# Patient Record
Sex: FEMALE | Race: WHITE
[De-identification: names, ages, dates, MRNs, and addresses within clinical notes are randomized per-mention and may not be internally consistent; named-entity substitution may affect disease eponyms.]

---

## 2017-12-21 ENCOUNTER — HOSPITAL ENCOUNTER (OUTPATIENT)
Dept: HOSPITAL 62 - SC | Age: 72
Discharge: HOME | End: 2017-12-21
Attending: OPHTHALMOLOGY
Payer: MEDICARE

## 2017-12-21 DIAGNOSIS — K21.9: ICD-10-CM

## 2017-12-21 DIAGNOSIS — Z79.899: ICD-10-CM

## 2017-12-21 DIAGNOSIS — F17.210: ICD-10-CM

## 2017-12-21 DIAGNOSIS — H43.813: ICD-10-CM

## 2017-12-21 DIAGNOSIS — Z79.1: ICD-10-CM

## 2017-12-21 DIAGNOSIS — H04.123: ICD-10-CM

## 2017-12-21 DIAGNOSIS — I10: ICD-10-CM

## 2017-12-21 DIAGNOSIS — H25.13: Primary | ICD-10-CM

## 2017-12-21 DIAGNOSIS — E78.00: ICD-10-CM

## 2017-12-21 DIAGNOSIS — E03.9: ICD-10-CM

## 2017-12-21 PROCEDURE — V2632 POST CHMBR INTRAOCULAR LENS: HCPCS

## 2017-12-21 PROCEDURE — 66984 XCAPSL CTRC RMVL W/O ECP: CPT

## 2017-12-21 PROCEDURE — 08RJ3JZ REPLACEMENT OF RIGHT LENS WITH SYNTHETIC SUBSTITUTE, PERCUTANEOUS APPROACH: ICD-10-PCS | Performed by: OPHTHALMOLOGY

## 2017-12-21 RX ADMIN — BESIFLOXACIN PRN DROP: 6 SUSPENSION OPHTHALMIC at 07:55

## 2017-12-21 RX ADMIN — BESIFLOXACIN PRN DROP: 6 SUSPENSION OPHTHALMIC at 07:15

## 2017-12-21 RX ADMIN — TROPICAMIDE PRN DROP: 10 SOLUTION/ DROPS OPHTHALMIC at 07:29

## 2017-12-21 RX ADMIN — CYCLOPENTOLATE HYDROCHLORIDE AND PHENYLEPHRINE HYDROCHLORIDE PRN DROP: 2; 10 SOLUTION/ DROPS OPHTHALMIC at 07:05

## 2017-12-21 RX ADMIN — CYCLOPENTOLATE HYDROCHLORIDE AND PHENYLEPHRINE HYDROCHLORIDE PRN DROP: 2; 10 SOLUTION/ DROPS OPHTHALMIC at 07:15

## 2017-12-21 RX ADMIN — CYCLOPENTOLATE HYDROCHLORIDE AND PHENYLEPHRINE HYDROCHLORIDE PRN DROP: 2; 10 SOLUTION/ DROPS OPHTHALMIC at 07:29

## 2017-12-21 RX ADMIN — BESIFLOXACIN PRN DROP: 6 SUSPENSION OPHTHALMIC at 07:05

## 2017-12-21 RX ADMIN — TROPICAMIDE PRN DROP: 10 SOLUTION/ DROPS OPHTHALMIC at 07:05

## 2017-12-21 RX ADMIN — TETRACAINE HYDROCHLORIDE PRN DROP: 5 SOLUTION OPHTHALMIC at 07:36

## 2017-12-21 RX ADMIN — TETRACAINE HYDROCHLORIDE PRN DROP: 5 SOLUTION OPHTHALMIC at 07:05

## 2017-12-21 RX ADMIN — TROPICAMIDE PRN DROP: 10 SOLUTION/ DROPS OPHTHALMIC at 07:15

## 2017-12-21 RX ADMIN — TETRACAINE HYDROCHLORIDE PRN DROP: 5 SOLUTION OPHTHALMIC at 07:29

## 2017-12-21 NOTE — [facility] OPERATIVE REPORT E
[facility] Operative [facility]



NAME: MIKEY YA

                                      MRN: [id number]

                             AGE: 72Y

DATE OF SURGERY: 12/21/2017           ROOM:



PREOPERATIVE DIAGNOSIS:

CATARACT, LEFT EYE.



POSTOPERATIVE DIAGNOSIS:

CATARACT, LEFT EYE.



OPERATION:

Cataract extraction with intraocular lens implant of the left eye.



SURGEON:

FAREED MCMAHON M.D.



ANESTHESIA:

Topical.



PROCEDURE:

After obtaining appropriate consent, the patient's left eye was prepped and

draped in sterile fashion as well as the surgeon in a sterile manner and

cataract surgery was started.  First a paracentesis blade was used to make

a small side-port incision.  Viscoelastic was used to inflate the anterior

chamber.  Next a 2.4 mm incision was made with the paracentesis blade.  A

continuous capsulorrhexis incision was made using a cystotome and Utrata

forceps.  Following this hydrodissection was carried out to make the lens

fully loose and mobile and it was rotated 90 degrees.  Following this, a

divide-and-conquer technique was used to phacoemulsify the lens with a CDE

of 1.46. The remaining cortex was removed with irrigation/aspiration. 

Provisc was instilled into the capsular bag to inflate the bag.  A SN60WF,

19.0 diopter lens was placed.  The remaining viscoelastic material was

removed with irrigation/aspiration.  Following this, a 10-0 nylon suture

was used to close the incision and it was found to be watertight.  Vigamox

was instilled in the eye and a protective shield was placed over the eye. 

The patient returned to the postoperative recovery in stable condition.







DICTATING PHYSICIAN: FAREED MCMAHON M.D.



1284M              DT: 12/21/2017 1546

PHY#: 2011         DD: 12/21/2017 1533

ID:   [id number]               JOB#: [id number]       ACCT: [id number]



cc:FAREED MCMAHON M.D.

>

## 2017-12-21 NOTE — DISCHARGE SUMMARY E
Discharge Summary



NAME: MIKEY YA

MRN:  [id number]        : 1945     AGE: 72Y

ADMITTED: 2017                  DISCHARGED: 2017



REASON FOR ADMISSION:

This is a 72-year-old patient who underwent cataract extraction of the

left eye.



DIAGNOSIS:

Cataract, left eye.



HOSPITAL COURSE:

She underwent surgery because she has having glare secondary to driving at

night secondary to headlights and also having trouble seeing road signs. 

She should be on a regular diet.  No bending at her waist.  No heavy

lifting.  She should use her Besivance, Ilevro, and Durezol at 3:00 p.m.

and 8:00 p.m. and sleep with a rigid shield, and I will see her for a

1-day postoperative tomorrow.





DICTATING PHYSICIAN:  FAREED MCMAHON M.D.





1284M                  DT: 2017    1548

PHY#: 2011            DD: 2017    1533

ID:   [id number]           JOB#: [id number]      ACCT: [id number]



cc:FAREED MCMAHON M.D.

>

## 2018-01-11 ENCOUNTER — HOSPITAL ENCOUNTER (OUTPATIENT)
Dept: HOSPITAL 62 - SC | Age: 73
Discharge: HOME | End: 2018-01-11
Attending: OPHTHALMOLOGY
Payer: MEDICARE

## 2018-01-11 DIAGNOSIS — S05.12XA: ICD-10-CM

## 2018-01-11 DIAGNOSIS — I10: ICD-10-CM

## 2018-01-11 DIAGNOSIS — E07.9: ICD-10-CM

## 2018-01-11 DIAGNOSIS — F17.210: ICD-10-CM

## 2018-01-11 DIAGNOSIS — Z96.1: ICD-10-CM
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
## 2018-01-11 DIAGNOSIS — H25.11: Primary | ICD-10-CM

## 2018-01-11 PROCEDURE — 66984 XCAPSL CTRC RMVL W/O ECP: CPT

## 2018-01-11 PROCEDURE — V2632 POST CHMBR INTRAOCULAR LENS: HCPCS

## 2018-01-11 PROCEDURE — 08RJ3JZ REPLACEMENT OF RIGHT LENS WITH SYNTHETIC SUBSTITUTE, PERCUTANEOUS APPROACH: ICD-10-PCS | Performed by: OPHTHALMOLOGY

## 2018-01-11 RX ADMIN — TROPICAMIDE PRN DROP: 10 SOLUTION/ DROPS OPHTHALMIC at 09:46

## 2018-01-11 RX ADMIN — TETRACAINE HYDROCHLORIDE PRN DROP: 5 SOLUTION OPHTHALMIC at 10:16

## 2018-01-11 RX ADMIN — TETRACAINE HYDROCHLORIDE PRN DROP: 5 SOLUTION OPHTHALMIC at 09:56

## 2018-01-11 RX ADMIN — BESIFLOXACIN PRN DROP: 6 SUSPENSION OPHTHALMIC at 09:56

## 2018-01-11 RX ADMIN — BESIFLOXACIN PRN DROP: 6 SUSPENSION OPHTHALMIC at 10:38

## 2018-01-11 RX ADMIN — TETRACAINE HYDROCHLORIDE PRN DROP: 5 SOLUTION OPHTHALMIC at 09:37

## 2018-01-11 RX ADMIN — CYCLOPENTOLATE HYDROCHLORIDE AND PHENYLEPHRINE HYDROCHLORIDE PRN DROP: 2; 10 SOLUTION/ DROPS OPHTHALMIC at 09:36

## 2018-01-11 RX ADMIN — BESIFLOXACIN PRN DROP: 6 SUSPENSION OPHTHALMIC at 09:36

## 2018-01-11 RX ADMIN — CYCLOPENTOLATE HYDROCHLORIDE AND PHENYLEPHRINE HYDROCHLORIDE PRN DROP: 2; 10 SOLUTION/ DROPS OPHTHALMIC at 09:56

## 2018-01-11 RX ADMIN — CYCLOPENTOLATE HYDROCHLORIDE AND PHENYLEPHRINE HYDROCHLORIDE PRN DROP: 2; 10 SOLUTION/ DROPS OPHTHALMIC at 09:46

## 2018-01-11 RX ADMIN — TROPICAMIDE PRN DROP: 10 SOLUTION/ DROPS OPHTHALMIC at 09:59

## 2018-01-11 RX ADMIN — TROPICAMIDE PRN DROP: 10 SOLUTION/ DROPS OPHTHALMIC at 09:36

## 2018-01-11 NOTE — [facility]PERAT[facility] REPORT E
[facility] Operative Report



NAME: MIKEY YA

                                      MRN: [id number]

                             AGE: 72Y

DATE OF SURGERY: 01/11/2018           ROOM:



PREOPERATIVE DIAGNOSIS:

CATARACT, RIGHT EYE.



POSTOPERATIVE DIAGNOSIS:

CATARACT, RIGHT EYE.



OPERATION:

Cataract extraction with intraocular lens implant of the right eye.



SURGEON:

FAREED MCMAHON M.D.



ANESTHESIA:

Topical.



PROCEDURE:

After obtaining appropriate consent, the patient's right eye was prepped

and draped in sterile fashion as well as the surgeon in a sterile manner

and cataract surgery was started.  First a paracentesis blade was used to

make a small side-port incision.  Viscoelastic was used to inflate the

anterior chamber.  Next a 2.4 mm incision was made with the paracentesis

blade.  A continuous capsulorrhexis incision was made using a cystotome and

Utrata forceps.  Following this hydrodissection was carried out to make the

lens fully loose and mobile and it was rotated 90 degrees.  Following this,

a divide-and-conquer technique was used to phacoemulsify the lens with a

CDE of 8.35.  The remaining cortex was removed with irrigation/aspiration. 

Provisc was instilled into the capsular bag to inflate the bag.  A SN60WF,

19.0 diopter lens was placed.  The remaining viscoelastic material was

removed with irrigation/aspiration.  Following this, a 10-0 nylon suture

was used to close the incision and it was found to be watertight.  Vigamox

was instilled in the eye and a protective shield was placed over the eye. 

The patient returned to the postoperative recovery in stable condition.







DICTATING PHYSICIAN: FAREED MCMAHON M.D.



1284M              DT: 01/11/2018 1903

PHY#: 2011         DD: 01/11/2018 1853

ID:   [id number]               JOB#: [id number]       ACCT: [id number]



cc:FAREED MCMAHON M.D.

>

## 2018-01-11 NOTE — DISCHARGE SUMMARY E
Discharge Summary



NAME: MIKEY YA

MRN:  [id number]        : 1945     AGE: 72Y

ADMITTED: 2018                  DISCHARGED: 2018



HISTORY:

This is a 72-year-old female who underwent cataract extraction of the

right eye.



DIAGNOSIS:

Cataract, right eye.



HOSPITAL COURSE:

She underwent surgery because she has difficulty watching TV.  She should

be on a regular diet.  No bending at her waist.  No heavy lifting.  She

should use her Besivance, Ilevro, and Durezol at 3:00 p.m. and 8:00 p.m.

and sleep with a rigid shield, and I will see her for a 1-day

postoperative tomorrow.





DICTATING PHYSICIAN:  FAREED MCMAHON M.D.





1284M                  DT: 2018    1905

PHY#: 2011            DD: 2018    1853

ID:   [id number]           JOB#: [id number]      ACCT: [id number]



cc:FAREED MCMAHON M.D.

>